# Patient Record
Sex: MALE | Race: WHITE | Employment: OTHER | URBAN - METROPOLITAN AREA
[De-identification: names, ages, dates, MRNs, and addresses within clinical notes are randomized per-mention and may not be internally consistent; named-entity substitution may affect disease eponyms.]

---

## 2024-08-27 SDOH — HEALTH STABILITY: PHYSICAL HEALTH: ON AVERAGE, HOW MANY DAYS PER WEEK DO YOU ENGAGE IN MODERATE TO STRENUOUS EXERCISE (LIKE A BRISK WALK)?: 0 DAYS

## 2024-08-30 ENCOUNTER — OFFICE VISIT (OUTPATIENT)
Age: 54
End: 2024-08-30
Payer: COMMERCIAL

## 2024-08-30 VITALS
BODY MASS INDEX: 28.16 KG/M2 | TEMPERATURE: 97.7 F | HEART RATE: 82 BPM | SYSTOLIC BLOOD PRESSURE: 128 MMHG | DIASTOLIC BLOOD PRESSURE: 82 MMHG | HEIGHT: 73 IN | WEIGHT: 212.5 LBS | RESPIRATION RATE: 18 BRPM

## 2024-08-30 DIAGNOSIS — R97.20 ELEVATED PSA: ICD-10-CM

## 2024-08-30 DIAGNOSIS — S90.222A CONTUSION OF TOENAIL OF LEFT FOOT, INITIAL ENCOUNTER: ICD-10-CM

## 2024-08-30 DIAGNOSIS — E78.00 HIGH CHOLESTEROL: ICD-10-CM

## 2024-08-30 DIAGNOSIS — E78.00 HIGH CHOLESTEROL: Primary | ICD-10-CM

## 2024-08-30 PROCEDURE — 99202 OFFICE O/P NEW SF 15 MIN: CPT

## 2024-08-30 PROCEDURE — 99213 OFFICE O/P EST LOW 20 MIN: CPT

## 2024-08-30 RX ORDER — ATORVASTATIN CALCIUM 20 MG/1
20 TABLET, FILM COATED ORAL DAILY
Qty: 30 TABLET | Refills: 2 | Status: SHIPPED | OUTPATIENT
Start: 2024-08-30 | End: 2024-08-31

## 2024-08-30 RX ORDER — ASCORBIC ACID 250 MG
TABLET,CHEWABLE ORAL
COMMUNITY

## 2024-08-30 RX ORDER — ASPIRIN 81 MG/1
81 TABLET ORAL DAILY
COMMUNITY

## 2024-08-30 RX ORDER — MULTIVITAMIN
TABLET ORAL DAILY
COMMUNITY

## 2024-08-30 RX ORDER — ATORVASTATIN CALCIUM 20 MG/1
20 TABLET, FILM COATED ORAL DAILY
COMMUNITY
Start: 2023-01-12 | End: 2024-08-30 | Stop reason: SDUPTHER

## 2024-08-30 SDOH — ECONOMIC STABILITY: FOOD INSECURITY: WITHIN THE PAST 12 MONTHS, YOU WORRIED THAT YOUR FOOD WOULD RUN OUT BEFORE YOU GOT MONEY TO BUY MORE.: NEVER TRUE

## 2024-08-30 SDOH — ECONOMIC STABILITY: FOOD INSECURITY: WITHIN THE PAST 12 MONTHS, THE FOOD YOU BOUGHT JUST DIDN'T LAST AND YOU DIDN'T HAVE MONEY TO GET MORE.: NEVER TRUE

## 2024-08-30 SDOH — ECONOMIC STABILITY: INCOME INSECURITY: HOW HARD IS IT FOR YOU TO PAY FOR THE VERY BASICS LIKE FOOD, HOUSING, MEDICAL CARE, AND HEATING?: NOT HARD AT ALL

## 2024-08-30 ASSESSMENT — PATIENT HEALTH QUESTIONNAIRE - PHQ9
1. LITTLE INTEREST OR PLEASURE IN DOING THINGS: NOT AT ALL
2. FEELING DOWN, DEPRESSED OR HOPELESS: NOT AT ALL
SUM OF ALL RESPONSES TO PHQ QUESTIONS 1-9: 0
SUM OF ALL RESPONSES TO PHQ9 QUESTIONS 1 & 2: 0
SUM OF ALL RESPONSES TO PHQ QUESTIONS 1-9: 0

## 2024-08-30 ASSESSMENT — LIFESTYLE VARIABLES
HOW OFTEN DO YOU HAVE A DRINK CONTAINING ALCOHOL: 2-3 TIMES A WEEK
HOW MANY STANDARD DRINKS CONTAINING ALCOHOL DO YOU HAVE ON A TYPICAL DAY: 3 OR 4

## 2024-08-30 NOTE — PATIENT INSTRUCTIONS
Thank you for following up with us at [facility] outpatient residency clinic! It was a pleasure to meet you today!     Our plan is the following:  - Your PSA was previously elevated in [place]. As it has been six months I would like to recheck.   - For the discoloration of your nailbed, continue to monitor. If it does not go away in 3 weeks please give our office a call for evaluation.     If you have any additional questions or concerns, please call the office ([phone]) and speak to one of the staff. They will triage and forward the message to the doctors! Have a great rest of your day!

## 2024-08-30 NOTE — PROGRESS NOTES
or further testing to rule out underlying pathology.    Patient was discussed with Dr. Leila Ko at the time of the encounter.    Fred George DO PGY-2     COMMUNICATION:       Electronically signed by Fred George DO on 8/31/2024 at 11:43 PM

## 2024-08-31 RX ORDER — ATORVASTATIN CALCIUM 20 MG/1
20 TABLET, FILM COATED ORAL DAILY
Qty: 90 TABLET | Refills: 0 | Status: SHIPPED | OUTPATIENT
Start: 2024-08-31 | End: 2024-11-29

## 2024-09-04 ENCOUNTER — HOSPITAL ENCOUNTER (OUTPATIENT)
Age: 54
Setting detail: SPECIMEN
Discharge: HOME | End: 2024-09-04

## 2024-09-04 DIAGNOSIS — R97.20 ELEVATED PSA: ICD-10-CM

## 2024-09-04 LAB — PSA SERPL-MCNC: 3.8 NG/ML (ref 0–4)

## 2024-09-25 ENCOUNTER — OFFICE VISIT (OUTPATIENT)
Age: 54
End: 2024-09-25
Payer: COMMERCIAL

## 2024-09-25 VITALS
BODY MASS INDEX: 27.7 KG/M2 | SYSTOLIC BLOOD PRESSURE: 140 MMHG | DIASTOLIC BLOOD PRESSURE: 82 MMHG | WEIGHT: 209 LBS | TEMPERATURE: 98.7 F | HEART RATE: 78 BPM | RESPIRATION RATE: 16 BRPM | HEIGHT: 73 IN

## 2024-09-25 DIAGNOSIS — L60.9 NAIL LESION: Primary | ICD-10-CM

## 2024-09-25 PROCEDURE — 99211 OFF/OP EST MAY X REQ PHY/QHP: CPT | Performed by: FAMILY MEDICINE

## 2024-09-25 PROCEDURE — 99213 OFFICE O/P EST LOW 20 MIN: CPT | Performed by: FAMILY MEDICINE

## 2024-09-26 ASSESSMENT — VISUAL ACUITY: OU: 1

## 2024-09-26 ASSESSMENT — ENCOUNTER SYMPTOMS: COLOR CHANGE: 1

## 2024-11-29 DIAGNOSIS — E78.00 HIGH CHOLESTEROL: ICD-10-CM

## 2024-11-29 RX ORDER — ATORVASTATIN CALCIUM 20 MG/1
20 TABLET, FILM COATED ORAL DAILY
Qty: 90 TABLET | Refills: 0 | Status: SHIPPED | OUTPATIENT
Start: 2024-11-29 | End: 2025-02-27

## 2024-12-16 DIAGNOSIS — Z00.00 WELL ADULT EXAM: Primary | ICD-10-CM

## 2024-12-16 DIAGNOSIS — R73.01 ELEVATED FASTING GLUCOSE: ICD-10-CM

## 2024-12-16 DIAGNOSIS — E78.00 HIGH CHOLESTEROL: ICD-10-CM

## 2024-12-16 RX ORDER — ATORVASTATIN CALCIUM 20 MG/1
20 TABLET, FILM COATED ORAL DAILY
Qty: 30 TABLET | Refills: 2 | Status: SHIPPED | OUTPATIENT
Start: 2024-12-16 | End: 2025-03-16

## 2024-12-16 NOTE — TELEPHONE ENCOUNTER
Patient has PE scheduled for 1/31/24.  Do you want him to have any blood work before appointment?  Also, his insurance changed and can only get 30 day supply of medication.  Asking if you will send a new script for 30 day supply to [facility].

## 2025-01-31 ENCOUNTER — HOSPITAL ENCOUNTER (OUTPATIENT)
Age: 55
Setting detail: SPECIMEN
Discharge: HOME | End: 2025-01-31

## 2025-01-31 ENCOUNTER — OFFICE VISIT (OUTPATIENT)
Age: 55
End: 2025-01-31
Payer: COMMERCIAL

## 2025-01-31 VITALS
BODY MASS INDEX: 29.82 KG/M2 | TEMPERATURE: 98.2 F | HEIGHT: 71 IN | HEART RATE: 92 BPM | RESPIRATION RATE: 16 BRPM | WEIGHT: 213 LBS | SYSTOLIC BLOOD PRESSURE: 139 MMHG | DIASTOLIC BLOOD PRESSURE: 81 MMHG

## 2025-01-31 DIAGNOSIS — E78.00 HIGH CHOLESTEROL: ICD-10-CM

## 2025-01-31 DIAGNOSIS — Z00.00 WELL ADULT EXAM: ICD-10-CM

## 2025-01-31 DIAGNOSIS — Z00.00 WELL ADULT EXAM: Primary | ICD-10-CM

## 2025-01-31 DIAGNOSIS — R73.01 ELEVATED FASTING GLUCOSE: ICD-10-CM

## 2025-01-31 LAB
ANION GAP SERPL CALCULATED.3IONS-SCNC: 12 MMOL/L (ref 9–16)
BUN SERPL-MCNC: 14 MG/DL (ref 6–20)
CALCIUM SERPL-MCNC: 9.3 MG/DL (ref 8.6–10.4)
CHLORIDE SERPL-SCNC: 103 MMOL/L (ref 98–107)
CHOLEST SERPL-MCNC: 168 MG/DL (ref 0–199)
CHOLESTEROL/HDL RATIO: 3.2
CO2 SERPL-SCNC: 22 MMOL/L (ref 20–31)
CREAT SERPL-MCNC: 0.9 MG/DL (ref 0.7–1.2)
EST. AVERAGE GLUCOSE BLD GHB EST-MCNC: 111 MG/DL
GFR, ESTIMATED: >90 ML/MIN/1.73M2
GLUCOSE SERPL-MCNC: 92 MG/DL (ref 74–99)
HBA1C MFR BLD: 5.5 % (ref 4–6)
HDLC SERPL-MCNC: 52 MG/DL
LDLC SERPL CALC-MCNC: 93 MG/DL (ref 0–100)
POTASSIUM SERPL-SCNC: 4.3 MMOL/L (ref 3.7–5.3)
SODIUM SERPL-SCNC: 137 MMOL/L (ref 136–145)
TRIGL SERPL-MCNC: 117 MG/DL
VLDLC SERPL CALC-MCNC: 23 MG/DL (ref 1–30)

## 2025-01-31 PROCEDURE — 99396 PREV VISIT EST AGE 40-64: CPT

## 2025-01-31 PROCEDURE — 99212 OFFICE O/P EST SF 10 MIN: CPT

## 2025-01-31 SDOH — ECONOMIC STABILITY: FOOD INSECURITY: WITHIN THE PAST 12 MONTHS, YOU WORRIED THAT YOUR FOOD WOULD RUN OUT BEFORE YOU GOT MONEY TO BUY MORE.: NEVER TRUE

## 2025-01-31 SDOH — ECONOMIC STABILITY: FOOD INSECURITY: WITHIN THE PAST 12 MONTHS, THE FOOD YOU BOUGHT JUST DIDN'T LAST AND YOU DIDN'T HAVE MONEY TO GET MORE.: NEVER TRUE

## 2025-01-31 ASSESSMENT — PATIENT HEALTH QUESTIONNAIRE - PHQ9
SUM OF ALL RESPONSES TO PHQ QUESTIONS 1-9: 0
SUM OF ALL RESPONSES TO PHQ9 QUESTIONS 1 & 2: 0
SUM OF ALL RESPONSES TO PHQ QUESTIONS 1-9: 0
SUM OF ALL RESPONSES TO PHQ QUESTIONS 1-9: 0
2. FEELING DOWN, DEPRESSED OR HOPELESS: NOT AT ALL
SUM OF ALL RESPONSES TO PHQ QUESTIONS 1-9: 0
1. LITTLE INTEREST OR PLEASURE IN DOING THINGS: NOT AT ALL

## 2025-01-31 NOTE — PROGRESS NOTES
[facility]  [address] [place] [address]  Phone: [phone]  Fax: [phone]      CHIEF COMPLAINT:     Kenton Kaur is a 54 y.o. male for a complete physical exam.    HPI     Subjective:  Patient has been doing well and has no concerns today.  Patient's health goals of the year is to stay healthy. Would like to stop smoking. Has cut smoking smoke less then a pack a day. Not ready to quit as of yet and will contact our office if needs assistance.  Educated on the importance of quitting smoking as well as educated on tools he could use to assist him.    He denies any concerns for depression or anxiety. He eats a well balanced diet and educated patient on the importance of staying active. He is  sexually active and would not like STI testing today.       REVIEWED INFORMATION      Past Medical History:   Diagnosis Date    High cholesterol      Past Surgical History:   Procedure Laterality Date    APPENDECTOMY      age 9    MOLE REMOVAL      x 2 age 6 yo. located at waist band, top of right leg.    WISDOM TOOTH EXTRACTION      afe 21 yo     Patient has no known allergies.  Social History     Tobacco Use    Smoking status: Every Day     Types: Cigarettes    Smokeless tobacco: Never   Substance Use Topics    Alcohol use: Yes     Family History   Problem Relation Age of Onset    Elevated Lipids Mother     Lung Cancer Mother         68    Hypertension Father     Irregular Heart Beat Father         defibrillator placed    Other Father         Amputation right leg, Aspirated.         Preventative Health Testing:  Colon cancer screening: Declines  Vaccines up to date: Declines  Prostate cancer screening: Not indicated at this time  Last lipid panel: Got drawn today  Last eye exam: Educated on the importance  Last dental visit: need to find a new dentist.   AAA screening: Not currently indicated  Hepatitis C/HIV screening: Declines  Lung cancer 
Spontaneous

## 2025-01-31 NOTE — PATIENT INSTRUCTIONS
Thank you for following up with us at [facility] outpatient residency clinic! It was a pleasure to meet you today!     Our plan is the following:  - Thanks for getting your labs today we will message you with your results.   - come back for your yearly physical next year.     If you have any additional questions or concerns, please call the office ([phone]) and speak to one of the staff. They will triage and forward the message to the doctors! Have a great rest of your day!

## 2025-03-19 DIAGNOSIS — E78.00 HIGH CHOLESTEROL: ICD-10-CM

## 2025-03-19 RX ORDER — ATORVASTATIN CALCIUM 20 MG/1
20 TABLET, FILM COATED ORAL DAILY
Qty: 90 TABLET | Refills: 0 | Status: SHIPPED | OUTPATIENT
Start: 2025-03-19 | End: 2025-06-17